# Patient Record
(demographics unavailable — no encounter records)

---

## 2025-06-20 NOTE — HISTORY OF PRESENT ILLNESS
[Urinary Urgency] : urinary urgency [Urinary Frequency] : urinary frequency [None] : None [FreeTextEntry1] : 32-year-old male patient with history of testicular discomfort  . Testis US advised in 06/2024 not done, patient lost his medical insurance for a while, now has Medicaid. He still reports testicular pain and discomfort; He also has daytime urinary frequency otherwise unchanged urinary symptoms. Denies flank pain, gross hematuria, dysuria or associated symptoms. He was also told to have renal stones by his urologist Dr Moon  a year ago. He reports taking cranberry juice.   ** He has history of ketamine abuse***   Past and present data reviewed: 06/2025 UA = Pro 100 05/2023 CT scan (Dr. Odonnell)= wnl, see report for details   Renal and Bladder ultrasound 08/08/23 Impression: 1 Cm nodule in the anterior bladder wall may be related to urachal remnant although not clearly seen on prior CT. here is suggestion that the Gallbladder is thick-walled and packed full of stones. There may be heterogeneous at deposition in the liver. These findings could be further evaluated with abdomen pelvis MRI. No hydronephrosis or shadowing renal stone.   [Nocturia] : no nocturia [Weak Stream] : no weak stream

## 2025-06-20 NOTE — ASSESSMENT
[FreeTextEntry1] : 1. testicular discomfort --etiology unclear.  May be patient's perception.. 2. ED --- likely behavioral and psychological   Plan: - Discussed pain management with Advil for testicular inflammation and pain  - Advised consultation with Dr Velásquez for left inguinal hernia on physical exam today  - Testicular US now -- will call patient if clinically indicated  - Continue Pyridium 100 MG PO TID PRN -- as patient requested for dysuria -- patient understands risks and consents  - Return PRN  - Patient has no other additional questions

## 2025-06-20 NOTE — END OF VISIT
[Time Spent: ___ minutes] : I have spent [unfilled] minutes of time on the encounter which excludes teaching and separately reported services. [4. Prevent psychological harm to patient or others] : Reason - Prevent psychological harm to patient or others [FreeTextEntry3] : Patient notes was transcribed by medical scribmichael Healy under the supervision of Dr. Blackburn. And I have reviewed the patient's chart and agree that it aligns with my medical decisions.

## 2025-06-20 NOTE — PHYSICAL EXAM
[General Appearance - Well Developed] : well developed [General Appearance - Well Nourished] : well nourished [Normal Appearance] : normal appearance [Well Groomed] : well groomed [Edema] : no peripheral edema [Respiration, Rhythm And Depth] : normal respiratory rhythm and effort [Abdomen Soft] : soft [Penis Abnormality] : normal circumcised penis [Testes Tenderness] : no tenderness of the testes [Testes Mass (___cm)] : there were no testicular masses [Normal Station and Gait] : the gait and station were normal for the patient's age [] : no rash [No Focal Deficits] : no focal deficits [Oriented To Time, Place, And Person] : oriented to person, place, and time [Affect] : the affect was normal [Mood] : the mood was normal [Chaperone Declined] : Patient declined chaperone [de-identified] : ?? small left inguinal hernia, reducible [de-identified] : right testicle no masses left smaller than right , no masses